# Patient Record
Sex: FEMALE | Employment: OTHER | URBAN - METROPOLITAN AREA
[De-identification: names, ages, dates, MRNs, and addresses within clinical notes are randomized per-mention and may not be internally consistent; named-entity substitution may affect disease eponyms.]

---

## 2017-06-01 ENCOUNTER — APPOINTMENT (OUTPATIENT)
Dept: GENERAL RADIOLOGY | Age: 82
End: 2017-06-01
Attending: PHYSICIAN ASSISTANT
Payer: MEDICARE

## 2017-06-01 ENCOUNTER — HOSPITAL ENCOUNTER (EMERGENCY)
Age: 82
Discharge: HOME | End: 2017-06-01
Attending: EMERGENCY MEDICINE
Payer: MEDICARE

## 2017-06-01 VITALS
HEIGHT: 58 IN | WEIGHT: 117 LBS | DIASTOLIC BLOOD PRESSURE: 68 MMHG | RESPIRATION RATE: 18 BRPM | OXYGEN SATURATION: 96 % | BODY MASS INDEX: 24.56 KG/M2 | HEART RATE: 99 BPM | SYSTOLIC BLOOD PRESSURE: 102 MMHG | TEMPERATURE: 99 F

## 2017-06-01 DIAGNOSIS — J40 BRONCHITIS: Primary | ICD-10-CM

## 2017-06-01 PROCEDURE — 85610 PROTHROMBIN TIME: CPT | Performed by: PHYSICIAN ASSISTANT

## 2017-06-01 PROCEDURE — 85025 COMPLETE CBC W/AUTO DIFF WBC: CPT | Performed by: PHYSICIAN ASSISTANT

## 2017-06-01 PROCEDURE — 85730 THROMBOPLASTIN TIME PARTIAL: CPT | Performed by: PHYSICIAN ASSISTANT

## 2017-06-01 PROCEDURE — 84484 ASSAY OF TROPONIN QUANT: CPT | Performed by: PHYSICIAN ASSISTANT

## 2017-06-01 PROCEDURE — 99285 EMERGENCY DEPT VISIT HI MDM: CPT

## 2017-06-01 PROCEDURE — 80053 COMPREHEN METABOLIC PANEL: CPT | Performed by: PHYSICIAN ASSISTANT

## 2017-06-01 PROCEDURE — 71020 XR CHEST PA + LAT CHEST (CPT=71020): CPT | Performed by: PHYSICIAN ASSISTANT

## 2017-06-01 PROCEDURE — 36415 COLL VENOUS BLD VENIPUNCTURE: CPT

## 2017-06-01 PROCEDURE — 93010 ELECTROCARDIOGRAM REPORT: CPT

## 2017-06-01 PROCEDURE — 93005 ELECTROCARDIOGRAM TRACING: CPT

## 2017-06-01 RX ORDER — SODIUM CHLORIDE 9 MG/ML
INJECTION, SOLUTION INTRAVENOUS CONTINUOUS
Status: DISCONTINUED | OUTPATIENT
Start: 2017-06-01 | End: 2017-06-01

## 2017-06-01 RX ORDER — AZITHROMYCIN 250 MG/1
TABLET, FILM COATED ORAL
Qty: 1 PACKAGE | Refills: 0 | Status: SHIPPED | OUTPATIENT
Start: 2017-06-01 | End: 2017-06-06

## 2017-06-01 RX ORDER — FUROSEMIDE 40 MG/1
40 TABLET ORAL DAILY
COMMUNITY

## 2017-06-01 RX ORDER — DILTIAZEM HCL 90 MG
180 TABLET ORAL 4 TIMES DAILY
COMMUNITY

## 2017-06-01 RX ORDER — POTASSIUM CHLORIDE 1.5 G/1.77G
20 POWDER, FOR SOLUTION ORAL 2 TIMES DAILY
COMMUNITY

## 2017-06-01 NOTE — ED INITIAL ASSESSMENT (HPI)
Pt has had cough, wheezing since last Saturday. Pt states she is feeling chest heaviness for the past 4 weeks. Pt is visiting from [facility]. Pt was in er for a fib. Started on diuretic. Pt has had a cardiac stent, and stroke history.

## 2017-06-01 NOTE — ED PROVIDER NOTES
Patient Seen in: THE [facility] Emergency Department In [place]    History   Patient presents with:  Chest Pain Angina (cardiovascular)    Stated Complaint: chest heaviness, hacky cough    HPI    63-year-old female with a history of coronary artery disease an Take 40 mg by mouth every morning before breakfast.     nitroGLYCERIN 0.4 MG Sublingual SL Tab,  Place 0.4 mg under the tongue every 5 (five) minutes as needed for Chest pain. Oxybutynin Chloride 5 MG Oral Tab,  Take 10 mg by mouth daily.      AmLODIPine the following:     BUN 34 (*)     GFR 49 (*)     All other components within normal limits   PROTHROMBIN TIME (PT) - Abnormal; Notable for the following:     PT 15.2 (*)     INR 1.23 (*)     All other components within normal limits   CBC W/ DIFFERENTIAL - adjacent to the fluid.     Right middle lobe benign granuloma with otherwise clear mid and upper zones.     Moderate cardiomegaly.           Dictated by: Colleen Albert MD on 6/01/2017 at 14:27       Approved by: Colleen Albert MD               Narrative: 6/1/2017  2:44 PM    START taking these medications    azithromycin (ZITHROMAX Z-LY) 250 MG Oral Tab  500 mg once followed by 250 mg daily x 4 days, Script printed, Disp-1 Package, [employer]

## (undated) NOTE — ED AVS SNAPSHOT
THE [facility] Emergency Department in [address] [facility]    Phone:  [phone]    Fax:  [phone] [facility]   MRN: [id number]    Department:  THE [facility] Emergency Department in [place]   Date of Visit If you have any problems with your follow-up, please call our  at [phone]    Si usted tiene algun problema con campo sequimiento, por favor llame a nuestro adminstrador de casos al [phone]    Expect to receive an electronic request Naila Sr [address] [place] [address] [place] [address] [facility]   [facility] [address] ([address] [facility]) [address]  (Do Right lower lobe segmental atelectasis adjacent to the fluid. Right middle lobe benign granuloma with otherwise clear mid and upper zones. Moderate cardiomegaly.            Dictated by: Kane Gold MD on 6/01/2017 at 14:27       Approved by: Aldo Bradford

## (undated) NOTE — ED AVS SNAPSHOT
THE [facility] Emergency Department in [address] [facility]    Phone:  [phone]    Fax:  [address] [facility]   MRN: [id number]    Department:  THE [facility] Emergency Department in [place]   Date of Visit IF THERE IS ANY CHANGE OR WORSENING OF YOUR CONDITION, CALL YOUR PRIMARY CARE PHYSICIAN AT ONCE OR RETURN IMMEDIATELY TO THE EMERGENCY DEPARTMENT.     If you have been prescribed any medication(s), please fill your prescription right away and begin taking t